# Patient Record
Sex: FEMALE | Race: WHITE
[De-identification: names, ages, dates, MRNs, and addresses within clinical notes are randomized per-mention and may not be internally consistent; named-entity substitution may affect disease eponyms.]

---

## 2019-07-27 ENCOUNTER — HOSPITAL ENCOUNTER (EMERGENCY)
Dept: HOSPITAL 11 - JP.ED | Age: 29
Discharge: HOME | End: 2019-07-27
Payer: COMMERCIAL

## 2019-07-27 VITALS — HEART RATE: 86 BPM | SYSTOLIC BLOOD PRESSURE: 136 MMHG | DIASTOLIC BLOOD PRESSURE: 83 MMHG

## 2019-07-27 DIAGNOSIS — Z88.6: ICD-10-CM

## 2019-07-27 DIAGNOSIS — Z88.1: ICD-10-CM

## 2019-07-27 DIAGNOSIS — S70.362A: Primary | ICD-10-CM

## 2019-07-27 DIAGNOSIS — Z88.5: ICD-10-CM
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
## 2019-07-27 PROCEDURE — 99282 EMERGENCY DEPT VISIT SF MDM: CPT

## 2019-07-27 NOTE — EDM.PDOC
ED HPI GENERAL MEDICAL PROBLEM





- General


Chief Complaint: Bite:Animal, Insect


Stated Complaint: TICK BITE


Time Seen by Provider: 07/27/19 22:30


Source of Information: Reports: Patient


History Limitations: Reports: No Limitations





- History of Present Illness


INITIAL COMMENTS - FREE TEXT/NARRATIVE: 


pulled deer tick off of left inner upper thigh yesterday.  Surrounding bruising 

from site, no other complaints. No fever, chills, bullys eye rash


Duration: Day(s): (1)





- Related Data


 Allergies











Allergy/AdvReac Type Severity Reaction Status Date / Time


 


acetaminophen [From Vicodin] Allergy Unknown Nausea Verified 05/11/14 22:36


 


codeine Allergy Unknown Nausea Verified 05/11/14 22:36


 


hydrocodone bitartrate Allergy Unknown Nausea Verified 05/11/14 22:36





[From Vicodin]     


 


clindamycin Allergy  Hives Verified 08/03/14 22:57











Home Meds: 


 Home Meds





Fluticasone Propionate [Flovent] 1 puff IH BID PRN 08/03/14 [History]


Sertraline [Zoloft] 150 mg PO DAILY 07/27/19 [History]











Past Medical History





- Past Health History


Medical/Surgical History: Denies Medical/Surgical History





ED ROS GENERAL





- Review of Systems


Review Of Systems: ROS reveals no pertinent complaints other than HPI.





ED EXAM, ANIMAL BITE





- Physical Exam


Exam: See Below


Exam Limited By: No Limitations


General Appearance: Alert, WD/WN, No Apparent Distress


Head: Atraumatic


Neck: Normal Inspection, Supple, Non-Tender


Respiratory/Chest: No Respiratory Distress


Cardiovascular: Regular Rate, Rhythm


Extremities: Normal Inspection


Neurological: Alert, Oriented, CN II-XII Intact


Psychiatric: Normal Affect, Normal Mood


Skin Exam: Normal Color, Ecchymosis (mild left upper innner thigh from where 

tick was removed)


Lymphatic: No Adenopathy





Course





- Vital Signs


Last Recorded V/S: 





 Last Vital Signs











Temp  35.5 C   07/27/19 22:27


 


Pulse  86   07/27/19 22:27


 


Resp  16   07/27/19 22:27


 


BP  136/83   07/27/19 22:27


 


Pulse Ox  99   07/27/19 22:27








Tick bite, removed, deer tick (patient brought with), mild surrounding 

ecchymosis.  No other concerns or findings on exam.  Given prophylactic dose of 

doxycycline here.  Discharged in stable condition after discussing reasons to 

return to the ED. 





Departure





- Departure


Time of Disposition: 23:00


Disposition: Home, Self-Care 01


Condition: Good


Clinical Impression: 


 Tick bite








- Discharge Information


Instructions:  Tick Bite Information, Adult, Easy-to-Read


Referrals: 


Camryn Cordon MD [Primary Care Provider] - 


Additional Instructions: 


Monitor for any fever, joint pain, bull's eye rash, if any symptoms or concerns 

be re-evaluated.  Pump and dump for 24 hours.

## 2020-02-09 ENCOUNTER — HOSPITAL ENCOUNTER (EMERGENCY)
Dept: HOSPITAL 11 - JP.ED | Age: 30
Discharge: NURSING, REHAB OR LONG-TERM CARE | End: 2020-02-09
Payer: COMMERCIAL

## 2020-02-09 VITALS — DIASTOLIC BLOOD PRESSURE: 78 MMHG | SYSTOLIC BLOOD PRESSURE: 126 MMHG | HEART RATE: 88 BPM

## 2020-02-09 DIAGNOSIS — Z88.1: ICD-10-CM

## 2020-02-09 DIAGNOSIS — F32.9: ICD-10-CM

## 2020-02-09 DIAGNOSIS — Z79.899: ICD-10-CM

## 2020-02-09 DIAGNOSIS — O02.1: Primary | ICD-10-CM

## 2020-02-09 DIAGNOSIS — Z88.5: ICD-10-CM

## 2020-02-09 DIAGNOSIS — F41.9: ICD-10-CM

## 2020-02-09 NOTE — CRLUS
HISTORY:



Vaginal bleeding.



COMPARISON:



01/20/2020. LMP of 10/24/2019 at 15 weeks 3 days gestation.



FINDINGS:



Single intrauterine embryo measuring 14 weeks 5 days gestation. No cardiac 

activity is identified. No fetal movement.



Results called to Dr. Raya.



Dictated by Sharon Lund MD @ Feb 9 2020  9:25AM



Signed by Dr. Sharon Lund @ Feb 9 2020  9:25AM

## 2020-02-09 NOTE — EDM.PDOC
ED HPI GENERAL MEDICAL PROBLEM





- General


Chief Complaint: OB/GYN Problem


Stated Complaint: BLEEDING,15 WKS PREGNANT


Time Seen by Provider: 02/09/20 07:45


Source of Information: Reports: Patient


History Limitations: Reports: No Limitations





- History of Present Illness


INITIAL COMMENTS - FREE TEXT/NARRATIVE: 





pt got up this am and she had sig bright red vag bleeding. She is about 15 

weeks pregnant. She has not had bleeding earlier in the preg. She did have 

slight cramping but not severe. 


Duration: Hour(s):


Location: Reports: Abdomen


Associated Symptoms: Reports: No Other Symptoms


  ** Pelvic


Pain Score (Numeric/FACES): 2





- Related Data


 Allergies











Allergy/AdvReac Type Severity Reaction Status Date / Time


 


acetaminophen [From Vicodin] Allergy Unknown Nausea Verified 05/11/14 22:36


 


codeine Allergy Unknown Nausea Verified 05/11/14 22:36


 


hydrocodone bitartrate Allergy Unknown Nausea Verified 05/11/14 22:36





[From Vicodin]     


 


clindamycin Allergy  Hives Verified 08/03/14 22:57











Home Meds: 


 Home Meds





Fluticasone Propionate [Flovent] 1 puff IH BID PRN 08/03/14 [History]


Sertraline [Zoloft] 150 mg PO DAILY 07/27/19 [History]


Prenatal No122/Iron/Folic Acid [Prenatal Multi Tablet] 1 each PO DAILY 02/09/20 

[History]











Past Medical History





- Past Health History


Medical/Surgical History: Denies Medical/Surgical History


Psychiatric History: Reports: Anxiety, Depression





Social & Family History





- Tobacco Use


Smoking Status *Q: Never Smoker





- Caffeine Use


Caffeine Use: Reports: Coffee, Soda, Tea





- Recreational Drug Use


Recreational Drug Use: No





ED ROS GENERAL





- Review of Systems


Review Of Systems: See Below


Constitutional: Reports: Other (pt did have the flu earlier in the week. SHE 

WAS DOING ALOT OF VOMITING. )


HEENT: Reports: No Symptoms


Respiratory: Reports: No Symptoms


Cardiovascular: Reports: No Symptoms


Endocrine: Reports: No Symptoms


GI/Abdominal: Reports: Abdominal Pain, Other ( SLIGHT CRMPING)


: Reports: Other (VAG BLEEDING AT 15 WEEKS. )


Musculoskeletal: Reports: No Symptoms


Skin: Reports: No Symptoms


Neurological: Reports: No Symptoms


Psychiatric: Reports: No Symptoms





ED EXAM PREGNANCY





- Physical Exam


Exam: See Below


Text/Narrative:: 





pt has had a fair amount of bleeding this am. Slight cramping. She is 15 weeks 

preg. 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 


Exam Limited By: No Limitations


General Appearance: Alert, Anxious, Moderate Distress


Ears: Normal TMs


Nose: Normal Inspection


Throat/Mouth: Normal Inspection


Head: Atraumatic


Neck: Normal Inspection


Respiratory/Chest: No Respiratory Distress


Cardiovascular: Regular Rate, Rhythm, Tachycardia


GI/Abdominal Exam: Soft, Non-Tender


Rectal Exam: Deferred


 (Female) Exam: Other ( This was not done before The US)


Fetal Heart Tones: Not [place]


Extremities: Normal Inspection


Neurological: Alert, Oriented, Normal Cognition





Course





- Vital Signs


Last Recorded V/S: 


 Last Vital Signs











Temp  36.6 C   02/09/20 07:01


 


Pulse  88   02/09/20 07:01


 


Resp  16   02/09/20 07:01


 


BP  126/78   02/09/20 07:01


 


Pulse Ox  99   02/09/20 07:01














- Orders/Labs/Meds


Labs: 


 Laboratory Tests











  02/09/20 02/09/20 Range/Units





  07:50 07:50 


 


WBC   7.4  (4.5-11.0)  K/uL


 


RBC   4.07  (3.30-5.50)  M/uL


 


Hgb   11.9 L  (12.0-15.0)  g/dL


 


Hct   34.8 L  (36.0-48.0)  %


 


MCV   86  (80-98)  fL


 


MCH   29  (27-31)  pg


 


MCHC   34  (32-36)  %


 


Plt Count   225  (150-400)  K/uL


 


Neut % (Auto)   70 H  (36-66)  %


 


Lymph % (Auto)   21 L  (24-44)  %


 


Mono % (Auto)   7 H  (2-6)  %


 


Eos % (Auto)   2  (2-4)  %


 


Baso % (Auto)   0  (0-1)  %


 


Sodium  142   (140-148)  mmol/L


 


Potassium  3.7   (3.6-5.2)  mmol/L


 


Chloride  108   (100-108)  mmol/L


 


Carbon Dioxide  23   (21-32)  mmol/L


 


Anion Gap  11.0   (5.0-14.0)  mmol/L


 


BUN  7   (7-18)  mg/dL


 


Creatinine  0.6   (0.6-1.0)  mg/dL


 


Est Cr Clr Drug Dosing  122.25   mL/min


 


Estimated GFR (MDRD)  > 60   (>60)  


 


Glucose  94   ()  mg/dL


 


Calcium  8.2 L   (8.5-10.1)  mg/dL














- Re-Assessments/Exams


Free Text/Narrative Re-Assessment/Exam: 





02/09/20 09:31


pt had normal labs. Her hg was 11.8. She had a US which showed evidence of a 

fetal death. 





Departure





- Departure


Time of Disposition: 09:32


Disposition: DC/Tfer to Acute Hospital 02


Condition: Fair


Clinical Impression: 


 Fetal death








- Discharge Information


Instructions:  Stillbirth


Referrals: 


Khloe Raymundo MD [Primary Care Provider] - 


Forms:  ED Department Discharge


Care Plan Goals: 


 Shakira Carranza is working on a transfer to Her OB physian in [place]. 





Sepsis Event Note





- Evaluation


Sepsis Screening Result: No Definite Risk





- Focused Exam


Date Exam was Performed: 02/18/20


Time Exam was Performed: 19:05

## 2022-12-02 ENCOUNTER — HOSPITAL ENCOUNTER (OUTPATIENT)
Dept: HOSPITAL 11 - JP.ED | Age: 32
LOS: 1 days | Discharge: HOME | End: 2022-12-03
Attending: SURGERY
Payer: COMMERCIAL

## 2022-12-02 DIAGNOSIS — Z79.899: ICD-10-CM

## 2022-12-02 DIAGNOSIS — K42.0: ICD-10-CM

## 2022-12-02 DIAGNOSIS — Z88.5: ICD-10-CM

## 2022-12-02 DIAGNOSIS — J45.909: ICD-10-CM

## 2022-12-02 DIAGNOSIS — F32.A: ICD-10-CM

## 2022-12-02 DIAGNOSIS — K80.12: Primary | ICD-10-CM

## 2022-12-02 DIAGNOSIS — F41.9: ICD-10-CM

## 2022-12-02 DIAGNOSIS — Z88.1: ICD-10-CM

## 2022-12-02 DIAGNOSIS — Z88.6: ICD-10-CM

## 2022-12-02 LAB — SARS-COV-2 RNA RESP QL NAA+PROBE: NEGATIVE

## 2022-12-02 PROCEDURE — 94640 AIRWAY INHALATION TREATMENT: CPT

## 2022-12-02 PROCEDURE — 82247 BILIRUBIN TOTAL: CPT

## 2022-12-02 PROCEDURE — 80053 COMPREHEN METABOLIC PANEL: CPT

## 2022-12-02 PROCEDURE — 76705 ECHO EXAM OF ABDOMEN: CPT

## 2022-12-02 PROCEDURE — 85025 COMPLETE CBC W/AUTO DIFF WBC: CPT

## 2022-12-02 PROCEDURE — 0241U: CPT

## 2022-12-02 PROCEDURE — 47562 LAPAROSCOPIC CHOLECYSTECTOMY: CPT

## 2022-12-02 PROCEDURE — C9113 INJ PANTOPRAZOLE SODIUM, VIA: HCPCS

## 2022-12-02 PROCEDURE — 84075 ASSAY ALKALINE PHOSPHATASE: CPT

## 2022-12-02 PROCEDURE — 36415 COLL VENOUS BLD VENIPUNCTURE: CPT

## 2022-12-02 PROCEDURE — 83690 ASSAY OF LIPASE: CPT

## 2022-12-02 PROCEDURE — 81025 URINE PREGNANCY TEST: CPT

## 2022-12-02 PROCEDURE — 88304 TISSUE EXAM BY PATHOLOGIST: CPT

## 2022-12-02 RX ADMIN — SODIUM CHLORIDE SCH MLS/HR: 9 INJECTION, SOLUTION INTRAVENOUS at 14:07

## 2022-12-02 RX ADMIN — ONDANSETRON PRN MG: 2 INJECTION, SOLUTION INTRAMUSCULAR; INTRAVENOUS at 19:56

## 2022-12-02 RX ADMIN — SODIUM CHLORIDE SCH MLS/HR: 9 INJECTION, SOLUTION INTRAVENOUS at 20:07

## 2022-12-02 RX ADMIN — ONDANSETRON PRN MG: 2 INJECTION, SOLUTION INTRAMUSCULAR; INTRAVENOUS at 11:58

## 2022-12-03 VITALS — DIASTOLIC BLOOD PRESSURE: 64 MMHG | HEART RATE: 94 BPM | SYSTOLIC BLOOD PRESSURE: 117 MMHG

## 2022-12-03 RX ADMIN — SODIUM CHLORIDE SCH MLS/HR: 9 INJECTION, SOLUTION INTRAVENOUS at 03:33
